# Patient Record
Sex: MALE | Race: WHITE | NOT HISPANIC OR LATINO | Employment: FULL TIME | URBAN - METROPOLITAN AREA
[De-identification: names, ages, dates, MRNs, and addresses within clinical notes are randomized per-mention and may not be internally consistent; named-entity substitution may affect disease eponyms.]

---

## 2022-03-03 ENCOUNTER — OFFICE VISIT (OUTPATIENT)
Dept: MEDICAL GROUP | Facility: LAB | Age: 42
End: 2022-03-03
Payer: OTHER GOVERNMENT

## 2022-03-03 VITALS
OXYGEN SATURATION: 98 % | TEMPERATURE: 96.8 F | HEART RATE: 64 BPM | HEIGHT: 73 IN | SYSTOLIC BLOOD PRESSURE: 100 MMHG | DIASTOLIC BLOOD PRESSURE: 62 MMHG | WEIGHT: 223 LBS | RESPIRATION RATE: 12 BRPM | BODY MASS INDEX: 29.55 KG/M2

## 2022-03-03 DIAGNOSIS — Z00.00 WELL ADULT EXAM: ICD-10-CM

## 2022-03-03 DIAGNOSIS — R20.0 NUMBNESS OF TOES: ICD-10-CM

## 2022-03-03 DIAGNOSIS — Z30.09 VASECTOMY EVALUATION: ICD-10-CM

## 2022-03-03 PROCEDURE — 99386 PREV VISIT NEW AGE 40-64: CPT | Performed by: FAMILY MEDICINE

## 2022-03-03 PROCEDURE — 99204 OFFICE O/P NEW MOD 45 MIN: CPT | Mod: 25 | Performed by: FAMILY MEDICINE

## 2022-03-03 RX ORDER — CHLORAL HYDRATE 500 MG
1000 CAPSULE ORAL
COMMUNITY
End: 2023-05-28

## 2022-03-03 ASSESSMENT — PATIENT HEALTH QUESTIONNAIRE - PHQ9: CLINICAL INTERPRETATION OF PHQ2 SCORE: 0

## 2022-03-03 NOTE — PROGRESS NOTES
Lencho Pro is a 42 y.o. male here to establish care and discuss labs, vasectomy evaluation.    HPI:      No chronic medical conditions.  No current medications.  Reports ER visit about 2 years ago for chest pain with reassuring cardiac work-up.  Did eventually see cardiology who recommended some lifestyle changes.  He has lost 60 pounds over the last year with regular exercise and change in diet.  No alcohol for the last 8 months.  Quit smoking 14 days ago.    Would also like referral for vasectomy.  No children, not currently in relationship.  Does not want future children.    He also reports some occasional swelling at the base of the left metatarsals as well as some numbness in his fourth and fifth toes.  Numbness will come positionally and then resolved.  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  Has switched to more supportive insoles after bilateral plantar fasciitis.    Current medicines (including changes today)  Current Outpatient Medications   Medication Sig Dispense Refill   • Omega-3 Fatty Acids (FISH OIL) 1000 MG Cap capsule Take 1,000 mg by mouth 3 times a day with meals.       No current facility-administered medications for this visit.     He  has a past medical history of Hypoglycemia.  He  has no past surgical history on file.  Social History     Tobacco Use   • Smoking status: Former Smoker     Types: Cigarettes   • Smokeless tobacco: Never Used   Vaping Use   • Vaping Use: Never used   Substance Use Topics   • Alcohol use: Not Currently   • Drug use: Not Currently     Social History     Social History Narrative   • Not on file     Family History   Problem Relation Age of Onset   • Alzheimer's Disease Other      Family Status   Relation Name Status   • OTHER  (Not Specified)       ROS  See HPI     Objective:     Physical Exam:  /62 (BP Location: Right arm, Patient Position: Sitting, BP Cuff Size: Adult)   Pulse 64   Temp 36 °C (96.8 °F)   Resp 12   Ht 1.854 m  "(6' 1\")   Wt 101 kg (223 lb)   SpO2 98%  Body mass index is 29.42 kg/m².  Constitutional: Alert. Well appearing. No distress.  Skin: Warm, dry, good turgor, no visible rashes.  Eye: Equal, round and reactive to light, conjunctiva clear, lids normal.  ENMT: Moist mucous membranes.   Respiratory: Normal effort. Lungs are clear to auscultation bilaterally.  Cardiovascular: Regular rate and rhythm. Normal S1/S2. No murmurs, rubs or gallops.   MSK: No obvious tenderness or swelling to the base of the left metatarsals.  Sensation is grossly intact to light touch to the left toes.  Neuro: Moves all four extremities. No facial droop.  Psych: Answers questions appropriately. Normal affect and mood.    Assessment and Plan:     1. Vasectomy evaluation  He desires vasectomy, referral sent to urology to discuss.  - Referral to Urology    2. Numbness of toes  Likely compressive or positional.  Discussed supportive footwear, could try adding metatarsal pad.    3. Well adult exam  Labs as below.  Defers vaccines today.  Discussed continued diet and lifestyle recommendations.  He did recently quit smoking, has been exercising regularly and eating healthy.  - CBC WITH DIFFERENTIAL; Future  - Comp Metabolic Panel; Future  - Lipid Profile; Future    Follow up: Return in about 1 year (around 3/3/2023), or if symptoms worsen or fail to improve.         PLEASE NOTE: This note was created using voice recognition software.   "

## 2022-03-04 ENCOUNTER — HOSPITAL ENCOUNTER (OUTPATIENT)
Dept: LAB | Facility: MEDICAL CENTER | Age: 42
End: 2022-03-04
Attending: FAMILY MEDICINE
Payer: OTHER GOVERNMENT

## 2022-03-04 ENCOUNTER — TELEPHONE (OUTPATIENT)
Dept: MEDICAL GROUP | Facility: LAB | Age: 42
End: 2022-03-04

## 2022-03-04 DIAGNOSIS — Z00.00 WELL ADULT EXAM: ICD-10-CM

## 2022-03-04 LAB
ALBUMIN SERPL BCP-MCNC: 4.1 G/DL (ref 3.2–4.9)
ALBUMIN/GLOB SERPL: 1.6 G/DL
ALP SERPL-CCNC: 74 U/L (ref 30–99)
ALT SERPL-CCNC: 11 U/L (ref 2–50)
ANION GAP SERPL CALC-SCNC: 9 MMOL/L (ref 7–16)
AST SERPL-CCNC: 14 U/L (ref 12–45)
BASOPHILS # BLD AUTO: 0.8 % (ref 0–1.8)
BASOPHILS # BLD: 0.06 K/UL (ref 0–0.12)
BILIRUB SERPL-MCNC: 0.2 MG/DL (ref 0.1–1.5)
BUN SERPL-MCNC: 15 MG/DL (ref 8–22)
CALCIUM SERPL-MCNC: 9.2 MG/DL (ref 8.5–10.5)
CHLORIDE SERPL-SCNC: 110 MMOL/L (ref 96–112)
CHOLEST SERPL-MCNC: 121 MG/DL (ref 100–199)
CO2 SERPL-SCNC: 25 MMOL/L (ref 20–33)
CREAT SERPL-MCNC: 1.13 MG/DL (ref 0.5–1.4)
EOSINOPHIL # BLD AUTO: 0.57 K/UL (ref 0–0.51)
EOSINOPHIL NFR BLD: 7.6 % (ref 0–6.9)
ERYTHROCYTE [DISTWIDTH] IN BLOOD BY AUTOMATED COUNT: 43.2 FL (ref 35.9–50)
FASTING STATUS PATIENT QL REPORTED: NORMAL
GLOBULIN SER CALC-MCNC: 2.5 G/DL (ref 1.9–3.5)
GLUCOSE SERPL-MCNC: 92 MG/DL (ref 65–99)
HCT VFR BLD AUTO: 47.1 % (ref 42–52)
HDLC SERPL-MCNC: 44 MG/DL
HGB BLD-MCNC: 15.5 G/DL (ref 14–18)
IMM GRANULOCYTES # BLD AUTO: 0.02 K/UL (ref 0–0.11)
IMM GRANULOCYTES NFR BLD AUTO: 0.3 % (ref 0–0.9)
LDLC SERPL CALC-MCNC: 69 MG/DL
LYMPHOCYTES # BLD AUTO: 2.29 K/UL (ref 1–4.8)
LYMPHOCYTES NFR BLD: 30.4 % (ref 22–41)
MCH RBC QN AUTO: 28.8 PG (ref 27–33)
MCHC RBC AUTO-ENTMCNC: 32.9 G/DL (ref 33.7–35.3)
MCV RBC AUTO: 87.4 FL (ref 81.4–97.8)
MONOCYTES # BLD AUTO: 0.77 K/UL (ref 0–0.85)
MONOCYTES NFR BLD AUTO: 10.2 % (ref 0–13.4)
NEUTROPHILS # BLD AUTO: 3.83 K/UL (ref 1.82–7.42)
NEUTROPHILS NFR BLD: 50.7 % (ref 44–72)
NRBC # BLD AUTO: 0 K/UL
NRBC BLD-RTO: 0 /100 WBC
PLATELET # BLD AUTO: 213 K/UL (ref 164–446)
PMV BLD AUTO: 10.4 FL (ref 9–12.9)
POTASSIUM SERPL-SCNC: 4.7 MMOL/L (ref 3.6–5.5)
PROT SERPL-MCNC: 6.6 G/DL (ref 6–8.2)
RBC # BLD AUTO: 5.39 M/UL (ref 4.7–6.1)
SODIUM SERPL-SCNC: 144 MMOL/L (ref 135–145)
TRIGL SERPL-MCNC: 39 MG/DL (ref 0–149)
WBC # BLD AUTO: 7.5 K/UL (ref 4.8–10.8)

## 2022-03-04 PROCEDURE — 80061 LIPID PANEL: CPT

## 2022-03-04 PROCEDURE — 36415 COLL VENOUS BLD VENIPUNCTURE: CPT

## 2022-03-04 PROCEDURE — 80053 COMPREHEN METABOLIC PANEL: CPT

## 2022-03-04 PROCEDURE — 85025 COMPLETE CBC W/AUTO DIFF WBC: CPT

## 2022-03-04 NOTE — TELEPHONE ENCOUNTER
----- Message from Vince Aguilar M.D. sent at 3/4/2022 12:19 PM PST -----  Please inform patient labs are normal/negative besides mildly elevated eosinophils which are a type of blood cell that can be related to allergies or asthma.  No further work-up needed at this time.

## 2022-08-21 ENCOUNTER — OFFICE VISIT (OUTPATIENT)
Dept: URGENT CARE | Facility: PHYSICIAN GROUP | Age: 42
End: 2022-08-21
Payer: OTHER GOVERNMENT

## 2022-08-21 VITALS
OXYGEN SATURATION: 98 % | TEMPERATURE: 99.2 F | HEART RATE: 88 BPM | SYSTOLIC BLOOD PRESSURE: 130 MMHG | HEIGHT: 73 IN | DIASTOLIC BLOOD PRESSURE: 80 MMHG | RESPIRATION RATE: 18 BRPM | WEIGHT: 223 LBS | BODY MASS INDEX: 29.55 KG/M2

## 2022-08-21 DIAGNOSIS — S61.209A AVULSION OF FINGERTIP, INITIAL ENCOUNTER: ICD-10-CM

## 2022-08-21 PROCEDURE — 90471 IMMUNIZATION ADMIN: CPT | Performed by: PHYSICIAN ASSISTANT

## 2022-08-21 PROCEDURE — 90715 TDAP VACCINE 7 YRS/> IM: CPT | Performed by: PHYSICIAN ASSISTANT

## 2022-08-21 PROCEDURE — 99213 OFFICE O/P EST LOW 20 MIN: CPT | Mod: 25 | Performed by: PHYSICIAN ASSISTANT

## 2022-08-21 ASSESSMENT — FIBROSIS 4 INDEX: FIB4 SCORE: 0.85

## 2022-08-22 NOTE — PROGRESS NOTES
"Subjective:   Lencho Pro is a 42 y.o. male who presents for Laceration (L ring finger, )     Patient presents with left ring finger laceration.  Sustained while cutting vegetables and food prepping today.  States the pulp of the finger was cut off.  He did throw this away.  He has had bleeding since difficulty stopping the bleeding.  Pain is reasonably well controlled.  He reports full range of motion to the finger.  No significant numbness or tingling.  He is not on any anticoagulants.  He does not believe his tetanus is up-to-date.  He feels life is likely clean.        Medications:  fish oil Caps    Allergies:             Eggs    Surgical History:       No past surgical history on file.    Past Social Hx:  Lencho Pro  reports that he has quit smoking. His smoking use included cigarettes. He has never used smokeless tobacco. He reports that he does not currently use alcohol. He reports that he does not currently use drugs.     Past Family Hx:   Lencho Pro family history includes Alzheimer's Disease in an other family member.       Problem list, medications, and allergies reviewed by myself today in Epic.     Objective:     /80 (BP Location: Left arm, Patient Position: Sitting, BP Cuff Size: Adult)   Pulse 88   Temp 37.3 °C (99.2 °F) (Temporal)   Resp 18   Ht 1.854 m (6' 1\")   Wt 101 kg (223 lb)   SpO2 98%   BMI 29.42 kg/m²     Physical Exam  Vitals and nursing note reviewed.   Constitutional:       General: He is not in acute distress.     Appearance: Normal appearance. He is not ill-appearing.   HENT:      Head: Normocephalic.   Eyes:      Extraocular Movements: Extraocular movements intact.      Pupils: Pupils are equal, round, and reactive to light.   Cardiovascular:      Rate and Rhythm: Normal rate.   Pulmonary:      Effort: Pulmonary effort is normal.   Musculoskeletal:        Hands:       Comments: The pulp of the left finger has been avulsed.  There is no nail " involvement.  There is no bony exposure.  Range of motion is full at the DIP.  There is ongoing bleeding despite compression.  No obvious foreign body.  No suspicion for foreign body.  The total surface of the pulp avulsion is approximately 1 x 1/2 cm.   Skin:     General: Skin is warm.      Findings: No rash.   Neurological:      Mental Status: He is alert and oriented to person, place, and time.   Psychiatric:         Thought Content: Thought content normal.         Judgment: Judgment normal.       Assessment/Plan:     Diagnosis and Associated Orders:     1. Avulsion of fingertip, initial encounter  - Tdap =>6yo IM        Comments/MDM:  Skin/pulp laceration with avulsion.  No area for laceration repair.  Wound copiously irrigated.  No foreign body.  Compression held.  Ultimately treated with Xeroform and bulky compressive dressing with Coban.  Monitored patient for 30 minutes with no signs of persistent bleeding.  Patient advised to continue elevating his upper extremity, ice to fingertip, leave dressing intact.  Tetanus updated.  Return in 48 to 72 hours for wound check.  Present to ER with persistent bleeding.  Do not have other hemostatic agents available for use here in the clinic.  Bleeding appeared to be controlled at end of visit with no active bleeding noted to dressing.  Use Tylenol for pain as needed.  I did spend greater than 30 minutes with wound irrigation, compression, application of dressing, monitoring patient for further bleeding.      I personally reviewed prior external notes and test results pertinent to today's visit.  Red flags discussed as well as indications to present to the Emergency Department.  Supportive care, natural history, differential diagnoses, and indications for immediate follow-up discussed.  Patient expresses understanding and agrees to plan.  Patient denies any other questions or concerns.    Follow-up with the primary care physician for recheck, reevaluation, and  consideration of further management.      Please note that this dictation was created using voice recognition software. I have made a reasonable attempt to correct obvious errors, but I expect that there are errors of grammar and possibly content that I did not discover before finalizing the note.    This note was electronically signed by Ladonna Mendoza PA-C

## 2022-11-15 ENCOUNTER — OFFICE VISIT (OUTPATIENT)
Dept: MEDICAL GROUP | Facility: LAB | Age: 42
End: 2022-11-15
Payer: OTHER GOVERNMENT

## 2022-11-15 VITALS
DIASTOLIC BLOOD PRESSURE: 74 MMHG | SYSTOLIC BLOOD PRESSURE: 120 MMHG | OXYGEN SATURATION: 99 % | WEIGHT: 240.2 LBS | BODY MASS INDEX: 31.83 KG/M2 | RESPIRATION RATE: 12 BRPM | HEIGHT: 73 IN | HEART RATE: 60 BPM | TEMPERATURE: 97 F

## 2022-11-15 DIAGNOSIS — S49.91XA INJURY OF RIGHT SHOULDER, INITIAL ENCOUNTER: ICD-10-CM

## 2022-11-15 PROCEDURE — 99213 OFFICE O/P EST LOW 20 MIN: CPT | Performed by: FAMILY MEDICINE

## 2022-11-15 ASSESSMENT — FIBROSIS 4 INDEX: FIB4 SCORE: 0.85

## 2022-11-15 NOTE — PROGRESS NOTES
"Subjective:     CC: Shoulder pain/injury    HPI:   Lencho presents today with chronic right shoulder pain.    R shoulder \"popped\" ~ 7 months ago while bench pressing. Some improvement with seeing chiropracter. No subsequent swelling or bruising after initial incident.  Sharp lateral pain, anterior as well. Both with direct pressure or most acitivites. Reports instability and weakness as well.  He has been trying to slowly reintroduce resistance exercises with continued pain even with low resistance.    Medications, past medical history, allergies, and social history have been reviewed and updated.      Objective:       Exam:  /74 (BP Location: Right arm, Patient Position: Sitting, BP Cuff Size: Large adult)   Pulse 60   Temp 36.1 °C (97 °F)   Resp 12   Ht 1.854 m (6' 1\")   Wt 109 kg (240 lb 3.2 oz)   SpO2 99%   BMI 31.69 kg/m²  Body mass index is 31.69 kg/m².    Constitutional: Alert. Well appearing. No distress.  Skin: Warm, dry, good turgor, no visible rashes.  Eye: Equal, round and reactive to light, conjunctiva clear, lids normal.  MSK: Normal range of motion at the right shoulder.  There is pain exacerbated with right biceps flexion against resistance.  Mild tenderness near the proximal right biceps insertion.  Partial asymmetry to the proximal right biceps as compared to the left.  Right rotator cuff testing is intact and nonpainful.  Neuro: Moves all four extremities. No facial droop.  Psych: Answers questions appropriately. Normal affect and mood.    Assessment & Plan:     42 y.o. male with the following -     1. Injury of right shoulder, initial encounter  Injury during bench press 7 months ago.  Continued anterior/lateral pain despite conservative management.  Exam indicative of possible right proximal biceps tendinopathy/partial tear.  X-ray ordered but suspect this will be unremarkable and will plan to proceed to MRI and referral to orthopedics.  - MR-SHOULDER-W/O RIGHT; Future  - " DX-SHOULDER 2+ RIGHT; Future  - Referral to Orthopedics    Please note that this note was created using voice recognition software.

## 2023-05-28 ENCOUNTER — APPOINTMENT (OUTPATIENT)
Dept: RADIOLOGY | Facility: MEDICAL CENTER | Age: 43
End: 2023-05-28
Attending: EMERGENCY MEDICINE
Payer: COMMERCIAL

## 2023-05-28 ENCOUNTER — HOSPITAL ENCOUNTER (EMERGENCY)
Facility: MEDICAL CENTER | Age: 43
End: 2023-05-28
Attending: EMERGENCY MEDICINE
Payer: COMMERCIAL

## 2023-05-28 ENCOUNTER — OFFICE VISIT (OUTPATIENT)
Dept: URGENT CARE | Facility: PHYSICIAN GROUP | Age: 43
End: 2023-05-28
Payer: OTHER GOVERNMENT

## 2023-05-28 VITALS
HEART RATE: 68 BPM | RESPIRATION RATE: 18 BRPM | OXYGEN SATURATION: 97 % | DIASTOLIC BLOOD PRESSURE: 86 MMHG | HEIGHT: 72 IN | WEIGHT: 248.4 LBS | TEMPERATURE: 98.7 F | BODY MASS INDEX: 33.64 KG/M2 | SYSTOLIC BLOOD PRESSURE: 118 MMHG

## 2023-05-28 VITALS
HEIGHT: 72 IN | BODY MASS INDEX: 33.53 KG/M2 | TEMPERATURE: 97.9 F | DIASTOLIC BLOOD PRESSURE: 96 MMHG | SYSTOLIC BLOOD PRESSURE: 134 MMHG | WEIGHT: 247.58 LBS | RESPIRATION RATE: 18 BRPM | OXYGEN SATURATION: 97 % | HEART RATE: 74 BPM

## 2023-05-28 DIAGNOSIS — N50.82 SCROTUM PAIN: ICD-10-CM

## 2023-05-28 DIAGNOSIS — N50.811 PAIN IN BOTH TESTICLES: ICD-10-CM

## 2023-05-28 DIAGNOSIS — N50.812 PAIN IN BOTH TESTICLES: ICD-10-CM

## 2023-05-28 DIAGNOSIS — R68.89 ABNORMAL TESTICULAR EXAM: ICD-10-CM

## 2023-05-28 LAB
ALBUMIN SERPL BCP-MCNC: 4.6 G/DL (ref 3.2–4.9)
ALBUMIN/GLOB SERPL: 1.5 G/DL
ALP SERPL-CCNC: 58 U/L (ref 30–99)
ALT SERPL-CCNC: 25 U/L (ref 2–50)
ANION GAP SERPL CALC-SCNC: 13 MMOL/L (ref 7–16)
APPEARANCE UR: CLEAR
APPEARANCE UR: CLEAR
AST SERPL-CCNC: 16 U/L (ref 12–45)
BASOPHILS # BLD AUTO: 0.8 % (ref 0–1.8)
BASOPHILS # BLD: 0.07 K/UL (ref 0–0.12)
BILIRUB SERPL-MCNC: 0.3 MG/DL (ref 0.1–1.5)
BILIRUB UR QL STRIP.AUTO: NEGATIVE
BILIRUB UR STRIP-MCNC: NORMAL MG/DL
BUN SERPL-MCNC: 14 MG/DL (ref 8–22)
CALCIUM ALBUM COR SERPL-MCNC: 9.1 MG/DL (ref 8.5–10.5)
CALCIUM SERPL-MCNC: 9.6 MG/DL (ref 8.5–10.5)
CHLORIDE SERPL-SCNC: 102 MMOL/L (ref 96–112)
CO2 SERPL-SCNC: 24 MMOL/L (ref 20–33)
COLOR UR AUTO: YELLOW
COLOR UR: YELLOW
CREAT SERPL-MCNC: 0.94 MG/DL (ref 0.5–1.4)
EOSINOPHIL # BLD AUTO: 0.16 K/UL (ref 0–0.51)
EOSINOPHIL NFR BLD: 1.9 % (ref 0–6.9)
ERYTHROCYTE [DISTWIDTH] IN BLOOD BY AUTOMATED COUNT: 40.3 FL (ref 35.9–50)
GFR SERPLBLD CREATININE-BSD FMLA CKD-EPI: 102 ML/MIN/1.73 M 2
GLOBULIN SER CALC-MCNC: 3.1 G/DL (ref 1.9–3.5)
GLUCOSE SERPL-MCNC: 89 MG/DL (ref 65–99)
GLUCOSE UR STRIP.AUTO-MCNC: NEGATIVE MG/DL
GLUCOSE UR STRIP.AUTO-MCNC: NORMAL MG/DL
HCT VFR BLD AUTO: 50.3 % (ref 42–52)
HGB BLD-MCNC: 16.7 G/DL (ref 14–18)
IMM GRANULOCYTES # BLD AUTO: 0.02 K/UL (ref 0–0.11)
IMM GRANULOCYTES NFR BLD AUTO: 0.2 % (ref 0–0.9)
KETONES UR STRIP.AUTO-MCNC: NEGATIVE MG/DL
KETONES UR STRIP.AUTO-MCNC: NORMAL MG/DL
LEUKOCYTE ESTERASE UR QL STRIP.AUTO: NEGATIVE
LEUKOCYTE ESTERASE UR QL STRIP.AUTO: NORMAL
LIPASE SERPL-CCNC: 18 U/L (ref 11–82)
LYMPHOCYTES # BLD AUTO: 2.4 K/UL (ref 1–4.8)
LYMPHOCYTES NFR BLD: 28 % (ref 22–41)
MCH RBC QN AUTO: 28.3 PG (ref 27–33)
MCHC RBC AUTO-ENTMCNC: 33.2 G/DL (ref 32.3–36.5)
MCV RBC AUTO: 85.1 FL (ref 81.4–97.8)
MICRO URNS: NORMAL
MONOCYTES # BLD AUTO: 0.56 K/UL (ref 0–0.85)
MONOCYTES NFR BLD AUTO: 6.5 % (ref 0–13.4)
NEUTROPHILS # BLD AUTO: 5.36 K/UL (ref 1.82–7.42)
NEUTROPHILS NFR BLD: 62.6 % (ref 44–72)
NITRITE UR QL STRIP.AUTO: NEGATIVE
NITRITE UR QL STRIP.AUTO: NORMAL
NRBC # BLD AUTO: 0 K/UL
NRBC BLD-RTO: 0 /100 WBC (ref 0–0.2)
PH UR STRIP.AUTO: 5.5 [PH] (ref 5–8)
PH UR STRIP.AUTO: 6 [PH] (ref 5–8)
PLATELET # BLD AUTO: 256 K/UL (ref 164–446)
PMV BLD AUTO: 9.3 FL (ref 9–12.9)
POTASSIUM SERPL-SCNC: 4.3 MMOL/L (ref 3.6–5.5)
PROT SERPL-MCNC: 7.7 G/DL (ref 6–8.2)
PROT UR QL STRIP: NEGATIVE MG/DL
PROT UR QL STRIP: NORMAL MG/DL
RBC # BLD AUTO: 5.91 M/UL (ref 4.7–6.1)
RBC UR QL AUTO: NEGATIVE
RBC UR QL AUTO: NORMAL
SODIUM SERPL-SCNC: 139 MMOL/L (ref 135–145)
SP GR UR STRIP.AUTO: 1.02
SP GR UR STRIP.AUTO: 1.02
UROBILINOGEN UR STRIP-MCNC: 0.2 MG/DL
UROBILINOGEN UR STRIP.AUTO-MCNC: 0.2 MG/DL
WBC # BLD AUTO: 8.6 K/UL (ref 4.8–10.8)

## 2023-05-28 PROCEDURE — 99215 OFFICE O/P EST HI 40 MIN: CPT | Performed by: PHYSICIAN ASSISTANT

## 2023-05-28 PROCEDURE — 36415 COLL VENOUS BLD VENIPUNCTURE: CPT | Mod: EDC

## 2023-05-28 PROCEDURE — 3074F SYST BP LT 130 MM HG: CPT | Performed by: PHYSICIAN ASSISTANT

## 2023-05-28 PROCEDURE — 85025 COMPLETE CBC W/AUTO DIFF WBC: CPT

## 2023-05-28 PROCEDURE — 87491 CHLMYD TRACH DNA AMP PROBE: CPT

## 2023-05-28 PROCEDURE — 87591 N.GONORRHOEAE DNA AMP PROB: CPT

## 2023-05-28 PROCEDURE — 76870 US EXAM SCROTUM: CPT

## 2023-05-28 PROCEDURE — 81003 URINALYSIS AUTO W/O SCOPE: CPT

## 2023-05-28 PROCEDURE — 80053 COMPREHEN METABOLIC PANEL: CPT

## 2023-05-28 PROCEDURE — 74176 CT ABD & PELVIS W/O CONTRAST: CPT

## 2023-05-28 PROCEDURE — 81002 URINALYSIS NONAUTO W/O SCOPE: CPT | Performed by: PHYSICIAN ASSISTANT

## 2023-05-28 PROCEDURE — 83690 ASSAY OF LIPASE: CPT

## 2023-05-28 PROCEDURE — 99283 EMERGENCY DEPT VISIT LOW MDM: CPT | Mod: EDC

## 2023-05-28 PROCEDURE — 3079F DIAST BP 80-89 MM HG: CPT | Performed by: PHYSICIAN ASSISTANT

## 2023-05-28 RX ORDER — ONDANSETRON 2 MG/ML
4 INJECTION INTRAMUSCULAR; INTRAVENOUS ONCE
Status: DISCONTINUED | OUTPATIENT
Start: 2023-05-28 | End: 2023-05-28

## 2023-05-28 RX ORDER — MORPHINE SULFATE 4 MG/ML
4 INJECTION INTRAVENOUS ONCE
Status: DISCONTINUED | OUTPATIENT
Start: 2023-05-28 | End: 2023-05-28

## 2023-05-28 RX ORDER — KETOROLAC TROMETHAMINE 30 MG/ML
15 INJECTION, SOLUTION INTRAMUSCULAR; INTRAVENOUS ONCE
Status: DISCONTINUED | OUTPATIENT
Start: 2023-05-28 | End: 2023-05-28 | Stop reason: HOSPADM

## 2023-05-28 ASSESSMENT — ENCOUNTER SYMPTOMS
GASTROINTESTINAL NEGATIVE: 1
CARDIOVASCULAR NEGATIVE: 1
CONSTITUTIONAL NEGATIVE: 1
RESPIRATORY NEGATIVE: 1

## 2023-05-28 ASSESSMENT — FIBROSIS 4 INDEX
FIB4 SCORE: 0.87
FIB4 SCORE: 0.87

## 2023-05-28 NOTE — ED NOTES
Urine collected and sent to lab.  Patient verified correct patient name and  on labeled specimen and were informed of estimated lab result wait times, verbalized understanding.  Toradol held per MAR, patient declines medication at this time.

## 2023-05-28 NOTE — ED NOTES
"First interaction with patient.  Assumed care at this time.  Patient presents to the ER today for complaint of testicular pain for the last 4 days.  He reports that his pain is mostly in his left testicle, but states that sometimes the pain radiates to the right.  He was seen at Urgent Care today and informed \"the left side is way almost up inside me.\"  He reports that he is voiding without issue.  He appears uncomfortable during assessment.      Gown provided, patient instructed to changed prior to ERP evaluation.  NPO status explained by this RN.  Call light provided.  Chart up for ERP.  "

## 2023-05-28 NOTE — ED PROVIDER NOTES
" ED PHYSICIAN NOTE    CHIEF COMPLAINT  Chief Complaint   Patient presents with    Sent from Urgent Care     Sent here from urgent care for possible testicular torsion. Reports pain in left testicle x few days describes as \"someone kicked him\"        EXTERNAL RECORDS REVIEWED  Office encounter today.  Referred to the ER to rule out testicular torsion and reported abnormal testicular exam    HPI/ROS      Lencho Pro is a 43 y.o. male who presents with scrotum pain.  Patient reports that for years he has nearly constant testicular pain.  Feels like someone has kicked him in the balls and the pain radiates up into his abdomen.  It got worse about 5 days ago.  Chlamy nothing in particular happened but according to urgent care a dog stepped on his scrotum.  His pain is now radiating from side to side and into his back.  He denies dysuria hematuria frequency.  No penile discharge.  No skin rash.    PAST MEDICAL HISTORY  Past Medical History:   Diagnosis Date    Hypoglycemia        SOCIAL HISTORY  Social History     Tobacco Use    Smoking status: Former     Types: Cigarettes    Smokeless tobacco: Never   Vaping Use    Vaping Use: Never used   Substance Use Topics    Alcohol use: Not Currently    Drug use: Not Currently       CURRENT MEDICATIONS  Home Medications       Reviewed by Rhiannon Mercado R.N. (Registered Nurse) on 05/28/23 at 1122  Med List Status: Partial     Medication Last Dose Status        Patient Derek Taking any Medications                           ALLERGIES  Allergies   Allergen Reactions    Eggs        PHYSICAL EXAM  VITAL SIGNS: /82   Pulse 86   Temp 36.4 °C (97.6 °F) (Temporal)   Resp 18   Ht 1.829 m (6')   Wt 112 kg (247 lb 9.2 oz)   SpO2 98%   BMI 33.58 kg/m²    Constitutional: Awake and alert  HENT: Normal inspection  Eyes: Normal inspection  Neck: Grossly normal range of motion.  Cardiovascular: Normal heart rate, Normal rhythm.  Symmetric peripheral pulses.   Thorax & Lungs: No " respiratory distress, No wheezing, No rales, No rhonchi, No chest tenderness.   Abdomen: Bowel sounds normal, soft, non-distended, nontender, no mass  Genitalia: Both testicles with normal lie normal cremaster reflex.  No testicular mass or abnormality.  No palpable hernias  Skin: No obvious rash.  Back: No tenderness, No CVA tenderness.   Extremities: No clubbing, cyanosis, edema, no Homans or cords.  Neurologic: Grossly normal   Psychiatric: Normal for situation     DIAGNOSTIC STUDIES / PROCEDURES  LABS/EKG  Results for orders placed or performed during the hospital encounter of 05/28/23   URINALYSIS CULTURE, IF INDICATED    Specimen: Urine, Clean Catch   Result Value Ref Range    Color Yellow     Character Clear     Specific Gravity 1.022 <1.035    Ph 5.5 5.0 - 8.0    Glucose Negative Negative mg/dL    Ketones Negative Negative mg/dL    Protein Negative Negative mg/dL    Bilirubin Negative Negative    Urobilinogen, Urine 0.2 Negative    Nitrite Negative Negative    Leukocyte Esterase Negative Negative    Occult Blood Negative Negative    Micro Urine Req see below    CBC WITH DIFFERENTIAL   Result Value Ref Range    WBC 8.6 4.8 - 10.8 K/uL    RBC 5.91 4.70 - 6.10 M/uL    Hemoglobin 16.7 14.0 - 18.0 g/dL    Hematocrit 50.3 42.0 - 52.0 %    MCV 85.1 81.4 - 97.8 fL    MCH 28.3 27.0 - 33.0 pg    MCHC 33.2 32.3 - 36.5 g/dL    RDW 40.3 35.9 - 50.0 fL    Platelet Count 256 164 - 446 K/uL    MPV 9.3 9.0 - 12.9 fL    Neutrophils-Polys 62.60 44.00 - 72.00 %    Lymphocytes 28.00 22.00 - 41.00 %    Monocytes 6.50 0.00 - 13.40 %    Eosinophils 1.90 0.00 - 6.90 %    Basophils 0.80 0.00 - 1.80 %    Immature Granulocytes 0.20 0.00 - 0.90 %    Nucleated RBC 0.00 0.00 - 0.20 /100 WBC    Neutrophils (Absolute) 5.36 1.82 - 7.42 K/uL    Lymphs (Absolute) 2.40 1.00 - 4.80 K/uL    Monos (Absolute) 0.56 0.00 - 0.85 K/uL    Eos (Absolute) 0.16 0.00 - 0.51 K/uL    Baso (Absolute) 0.07 0.00 - 0.12 K/uL    Immature Granulocytes (abs) 0.02  0.00 - 0.11 K/uL    NRBC (Absolute) 0.00 K/uL   Chlamydia/GC, PCR (Urine)    Specimen: Genital; Urine   Result Value Ref Range    Source Urine    CMP   Result Value Ref Range    Sodium 139 135 - 145 mmol/L    Potassium 4.3 3.6 - 5.5 mmol/L    Chloride 102 96 - 112 mmol/L    Co2 24 20 - 33 mmol/L    Anion Gap 13.0 7.0 - 16.0    Glucose 89 65 - 99 mg/dL    Bun 14 8 - 22 mg/dL    Creatinine 0.94 0.50 - 1.40 mg/dL    Calcium 9.6 8.5 - 10.5 mg/dL    AST(SGOT) 16 12 - 45 U/L    ALT(SGPT) 25 2 - 50 U/L    Alkaline Phosphatase 58 30 - 99 U/L    Total Bilirubin 0.3 0.1 - 1.5 mg/dL    Albumin 4.6 3.2 - 4.9 g/dL    Total Protein 7.7 6.0 - 8.2 g/dL    Globulin 3.1 1.9 - 3.5 g/dL    A-G Ratio 1.5 g/dL   LIPASE   Result Value Ref Range    Lipase 18 11 - 82 U/L   CORRECTED CALCIUM   Result Value Ref Range    Correct Calcium 9.1 8.5 - 10.5 mg/dL   ESTIMATED GFR   Result Value Ref Range    GFR (CKD-EPI) 102 >60 mL/min/1.73 m 2     RADIOLOGY  I have independently interpreted the diagnostic imaging associated with this visit and am waiting the final reading from the radiologist.   My preliminary interpretation is as follows: Blood flow to bilateral testicles  Radiologist interpretation:   CT-RENAL COLIC EVALUATION(A/P W/O)   Final Result      1.  No acute abnormality in the abdomen or pelvis. No renal stones or hydronephrosis.   2.  Colonic diverticulosis.   3.  Both testicles are symmetric in size.   4.  Small fat-containing left inguinal hernia.      US-DUPLEX TESTICLE COMPLETE (COMBO)   Final Result      1.  Normal scrotum ultrasound.            COURSE & MEDICAL DECISION MAKING    ED Observation Status? Yes; I am placing the patient in to an observation status due to a diagnostic uncertainty as well as therapeutic intensity. Patient placed in observation status at 1122 aM, 5/28/2023.     Observation plan is as follows: Obtain diagnostic testing, treat symptoms, serial exams    Upon Reevaluation, the patient's condition has:  Improved; and will be discharged.    Patient discharged from ED Observation status at 2 PM 5/28/2023     INITIAL ASSESSMENT, COURSE AND PLAN  Care Narrative: Patient presents with high risk complaints as above.  There was concern about testicular torsion.  This is not obvious on examination.  Emergency diagnostic testing is indicated.  Obtain ultrasound of the scrotum.  Given radiation to back obtain renal colic CT scan.  Obtain laboratory data.  Ordered Toradol for pain.    Patient declines Toradol    Ultrasound of the scrotum is negative.  Does not have torsion renal colic study is negative aside from a small fat-containing left inguinal hernia.  This does not appear to be of consequence today.  No mass lesion, kidney stone or other emergency abdominal findings.  Urinalysis negative for infection.  GC and Chlamydia probes are pending.  Renal function normal.  No leukocytosis or shift to suggest infection.    At this point etiology of scrotal pain is not clear.  There does not appear to be an emergency condition.  I will refer to urology.  Advised scrotal support and NSAIDs as needed.  Precaution patient to return for fevers, worsening pain, not improving concern.     DISPOSITION AND DISCUSSIONS      Escalation of care considered, and ultimately not performed:acute inpatient care management, however at this time, the patient is most appropriate for outpatient management    Prescription drugs considered and/or prescribed: Considered opiate prescription but nonnarcotic analgesic would be most appropriate    FINAL IMPRESSION  1.  Bilateral testicular pain, unspecified    This dictation was created using voice recognition software. The accuracy of the dictation is limited to the abilities of the software. I expect there may be some errors of grammar and possibly content. The nursing notes were reviewed and certain aspects of this information were incorporated into this note.    Electronically signed by: Bay TIWARI  LISA Harris, 5/28/2023

## 2023-05-28 NOTE — ED NOTES
20g PIV established to patient's left AC.  Patient verified correct patient name and  on labeled specimen.  Blood collected and sent to lab.  This RN provided possible lab wait times.  IV is saline locked at this time.

## 2023-05-28 NOTE — PROGRESS NOTES
Subjective:     Lencho Pro  is a 43 y.o. male who presents for Testicle Pain (X5 DAYS, About a month ago gf's dog step on one side and since then pain has been getting worse. Patient states gets woken around 3am and pain goes away around 9 or so)       He presents today with testicular pain that has been worsening over the last 5 days.  Symptoms have been ongoing over the past month.  States that symptoms initially began when his girlfriend's dog stepped on to the scrotum and his testicular pain began.  He states that the testicular pain rotates from the left to the right side throughout the day.  He notes high riding testicles and pain radiating up into the lower abdomen.  He has recently been experiencing reduced sexual drive and inability to ejaculate.  Denies fevers.  No pain with urination, no penile discharge.       Review of Systems   Constitutional: Negative.    Respiratory: Negative.     Cardiovascular: Negative.    Gastrointestinal: Negative.    Genitourinary:         Testicular pain      Allergies   Allergen Reactions    Eggs      Past Medical History:   Diagnosis Date    Hypoglycemia         Objective:   /86 (BP Location: Left arm, Patient Position: Sitting, BP Cuff Size: Adult)   Pulse 68   Temp 37.1 °C (98.7 °F) (Temporal)   Resp 18   Ht 1.829 m (6')   Wt 113 kg (248 lb 6.4 oz)   SpO2 97%   BMI 33.69 kg/m²   Physical Exam  Vitals and nursing note reviewed.   Constitutional:       General: He is not in acute distress.     Appearance: He is not ill-appearing or toxic-appearing.   HENT:      Head: Normocephalic.      Nose: No rhinorrhea.   Eyes:      General: No scleral icterus.     Conjunctiva/sclera: Conjunctivae normal.   Pulmonary:      Effort: Pulmonary effort is normal. No respiratory distress.      Breath sounds: No stridor.   Genitourinary:     Comments: Visual examination of the provider genitourinary tract does reveal Salvador Shant penile piercing.  Penis is normal  appearance, no discharge.  Examination of the testicles does reveal normal-appearing right testicle, left testicle is reduced in size and significantly elevated as compared to the contralateral side.  Neither testicle is tender on exam today.  Musculoskeletal:      Cervical back: Neck supple.   Neurological:      Mental Status: He is alert and oriented to person, place, and time.   Psychiatric:         Mood and Affect: Mood normal.         Behavior: Behavior normal.         Thought Content: Thought content normal.         Judgment: Judgment normal.             Diagnostic testing:     POC Urinalysis -all within normal limits    Assessment/Plan:     Encounter Diagnoses   Name Primary?    Pain in both testicles     Abnormal testicular exam           Plan for care for today's complaint includes transferring the patient to [facility] emergency department for timely evaluation of his left-sided receiving testicle.  This elevated testicle on exam along with his testicular pain does have concern for possible torsion or other testicular pathology.  We did contact scheduling department and attempt to perform outpatient scrotal ultrasound but no appointments were available today.  Due to inability for timely outpatient evaluation this does warrant emergency evaluation at this time.  Differential diagnosis does include epididymitis, testicular torsion, testicular mass, STD.    See AVS Instructions below for written guidance provided to patient on after-visit management and care in addition to our verbal discussion during the visit.    Please note that this dictation was created using voice recognition software. I have attempted to correct all errors, but there may be sound-alike, spelling, grammar and possibly content errors that I did not discover before finalizing the note.    Harpreet Ayon PA-C

## 2023-05-29 LAB
C TRACH DNA SPEC QL NAA+PROBE: NEGATIVE
N GONORRHOEA DNA SPEC QL NAA+PROBE: NEGATIVE
SPECIMEN SOURCE: NORMAL

## 2023-07-28 ENCOUNTER — OFFICE VISIT (OUTPATIENT)
Dept: URGENT CARE | Facility: PHYSICIAN GROUP | Age: 43
End: 2023-07-28
Payer: COMMERCIAL

## 2023-07-28 VITALS
RESPIRATION RATE: 16 BRPM | DIASTOLIC BLOOD PRESSURE: 74 MMHG | HEART RATE: 78 BPM | OXYGEN SATURATION: 97 % | WEIGHT: 245 LBS | BODY MASS INDEX: 33.18 KG/M2 | SYSTOLIC BLOOD PRESSURE: 118 MMHG | HEIGHT: 72 IN | TEMPERATURE: 98.6 F

## 2023-07-28 DIAGNOSIS — H02.846 SWELLING OF LEFT EYELID: ICD-10-CM

## 2023-07-28 DIAGNOSIS — H00.015 HORDEOLUM EXTERNUM OF LEFT LOWER EYELID: ICD-10-CM

## 2023-07-28 DIAGNOSIS — H57.89 EYE DRAINAGE: ICD-10-CM

## 2023-07-28 DIAGNOSIS — R22.0 LEFT FACIAL SWELLING: ICD-10-CM

## 2023-07-28 DIAGNOSIS — H44.002 EYE INFECTION, LEFT: ICD-10-CM

## 2023-07-28 PROCEDURE — 99213 OFFICE O/P EST LOW 20 MIN: CPT | Performed by: NURSE PRACTITIONER

## 2023-07-28 PROCEDURE — 3074F SYST BP LT 130 MM HG: CPT | Performed by: NURSE PRACTITIONER

## 2023-07-28 PROCEDURE — 3078F DIAST BP <80 MM HG: CPT | Performed by: NURSE PRACTITIONER

## 2023-07-28 RX ORDER — ERYTHROMYCIN 5 MG/G
1 OINTMENT OPHTHALMIC 3 TIMES DAILY
Qty: 3.5 G | Refills: 0 | Status: SHIPPED | OUTPATIENT
Start: 2023-07-28

## 2023-07-28 RX ORDER — CEPHALEXIN 500 MG/1
500 CAPSULE ORAL 4 TIMES DAILY
Qty: 20 CAPSULE | Refills: 0 | Status: SHIPPED | OUTPATIENT
Start: 2023-07-28 | End: 2023-08-02

## 2023-07-28 ASSESSMENT — VISUAL ACUITY: OU: 1

## 2023-07-28 ASSESSMENT — FIBROSIS 4 INDEX: FIB4 SCORE: .55

## 2023-07-28 NOTE — PROGRESS NOTES
Lencho Pro is a 43 y.o. male who presents for Eye Problem (Started Monday morning left eye lid swelled, bottom swelling )      HPI  This is a new problem. Lencho Pro is a 43 y.o. patient who presents to urgent care with c/o: left eye swelling, pain in eyelid. Treatment tried; home eye washes, ice pack. Swelling down in cheek much worse today - it started last night. Mild drainage from left eye - it was a little crusted this morning. No vision change. Does not wear contacts or glasses. No other aggravating or alleviating factors.           ROS See HPI    Allergies:       Allergies   Allergen Reactions   • Eggs        PMSFS Hx:  Past Medical History:   Diagnosis Date   • Hypoglycemia      No past surgical history on file.  Family History   Problem Relation Age of Onset   • Alzheimer's Disease Other      Social History     Tobacco Use   • Smoking status: Former     Types: Cigarettes   • Smokeless tobacco: Never   Substance Use Topics   • Alcohol use: Not Currently       Problems:   There is no problem list on file for this patient.      Medications:   No current outpatient medications on file prior to visit.     No current facility-administered medications on file prior to visit.          Objective:     /74 (BP Location: Right arm, Patient Position: Sitting, BP Cuff Size: Large adult)   Pulse 78   Temp 37 °C (98.6 °F) (Temporal)   Resp 16   Ht 1.829 m (6')   Wt 111 kg (245 lb)   SpO2 97%   BMI 33.23 kg/m²     Physical Exam  Vitals and nursing note reviewed.   Constitutional:       General: He is not in acute distress.     Appearance: He is well-developed. He is not toxic-appearing.   HENT:      Head: Normocephalic.     Eyes:      General: Lids are normal. Lids are everted, no foreign bodies appreciated. Vision grossly intact.         Left eye: Discharge present.     Extraocular Movements:      Left eye: Normal extraocular motion.      Conjunctiva/sclera:      Left eye: Left conjunctiva is  injected.      Pupils: Pupils are equal, round, and reactive to light.     Cardiovascular:      Rate and Rhythm: Normal rate and regular rhythm.   Pulmonary:      Effort: Pulmonary effort is normal.   Skin:     General: Skin is warm and dry.   Neurological:      Mental Status: He is alert.   Psychiatric:         Speech: Speech normal.         Behavior: Behavior normal. Behavior is cooperative.         Assessment /Associated Orders:      1. Hordeolum externum of left lower eyelid  erythromycin 5 MG/GM Ointment      2. Swelling of left eyelid  erythromycin 5 MG/GM Ointment      3. Eye drainage  erythromycin 5 MG/GM Ointment      4. Left facial swelling  cephALEXin (KEFLEX) 500 MG Cap      5. Eye infection, left  cephALEXin (KEFLEX) 500 MG Cap          Medical Decision Making:    Pt is clinically stable at today's acute urgent care visit.  No acute distress noted. Appropriate for outpatient care at this time.   Acute problem today with uncertain prognosis.   Educated in proper administration of  prescription medication(s) ordered today including safety, possible SE, risks, benefits, rationale and alternatives to therapy.   Use dilute baby shampoo solution to gently clean eyelashes and eyelid margin(s) daily for 2-3 days.   Warm compresses 3 or 4 times a day/ prn   Educated in infection control practices.   Educated in proper administration of  prescription medication(s) ordered today including safety, possible SE, risks, benefits, rationale and alternatives to therapy.       Discussed Dx, management options (risks,benefits, and alternatives to planned treatment), natural progression and supportive care.  Expressed understanding and the treatment plan was agreed upon.   Questions were encouraged and answered   Return to urgent care prn if new or worsening sx or if there is no improvement in condition prn.    Educated in Red flags and indications to immediately call 911 or present to the Emergency Department.          Please note that this dictation was created using voice recognition software. I have worked with consultants from the vendor as well as technical experts from [facility] to optimize the interface. I have made every reasonable attempt to correct obvious errors, but I expect that there are errors of grammar and possibly content that I did not discover before finalizing the note.  This note was electronically signed by provider